# Patient Record
Sex: MALE | Race: WHITE | HISPANIC OR LATINO | Employment: FULL TIME | URBAN - METROPOLITAN AREA
[De-identification: names, ages, dates, MRNs, and addresses within clinical notes are randomized per-mention and may not be internally consistent; named-entity substitution may affect disease eponyms.]

---

## 2024-06-07 ENCOUNTER — APPOINTMENT (OUTPATIENT)
Dept: GENERAL RADIOLOGY | Facility: CLINIC | Age: 19
End: 2024-06-07
Attending: EMERGENCY MEDICINE

## 2024-06-07 ENCOUNTER — HOSPITAL ENCOUNTER (EMERGENCY)
Facility: CLINIC | Age: 19
Discharge: HOME | End: 2024-06-08
Attending: EMERGENCY MEDICINE | Admitting: EMERGENCY MEDICINE

## 2024-06-07 DIAGNOSIS — S81.811A LACERATION OF RIGHT LOWER EXTREMITY, INITIAL ENCOUNTER: ICD-10-CM

## 2024-06-07 DIAGNOSIS — S80.812A ABRASION OF LEFT LOWER EXTREMITY, INITIAL ENCOUNTER: ICD-10-CM

## 2024-06-07 PROCEDURE — 73590 X-RAY EXAM OF LOWER LEG: CPT | Mod: RT

## 2024-06-07 PROCEDURE — 90715 TDAP VACCINE 7 YRS/> IM: CPT | Mod: JZ | Performed by: EMERGENCY MEDICINE

## 2024-06-07 PROCEDURE — 12004 RPR S/N/AX/GEN/TRK7.6-12.5CM: CPT

## 2024-06-07 PROCEDURE — 250N000011 HC RX IP 250 OP 636: Mod: JZ | Performed by: EMERGENCY MEDICINE

## 2024-06-07 PROCEDURE — 99283 EMERGENCY DEPT VISIT LOW MDM: CPT | Mod: 25

## 2024-06-07 PROCEDURE — 90471 IMMUNIZATION ADMIN: CPT | Performed by: EMERGENCY MEDICINE

## 2024-06-07 RX ADMIN — CLOSTRIDIUM TETANI TOXOID ANTIGEN (FORMALDEHYDE INACTIVATED), CORYNEBACTERIUM DIPHTHERIAE TOXOID ANTIGEN (FORMALDEHYDE INACTIVATED), BORDETELLA PERTUSSIS TOXOID ANTIGEN (GLUTARALDEHYDE INACTIVATED), BORDETELLA PERTUSSIS FILAMENTOUS HEMAGGLUTININ ANTIGEN (FORMALDEHYDE INACTIVATED), BORDETELLA PERTUSSIS PERTACTIN ANTIGEN, AND BORDETELLA PERTUSSIS FIMBRIAE 2/3 ANTIGEN 0.5 ML: 5; 2; 2.5; 5; 3; 5 INJECTION, SUSPENSION INTRAMUSCULAR at 23:55

## 2024-06-07 ASSESSMENT — COLUMBIA-SUICIDE SEVERITY RATING SCALE - C-SSRS
6. HAVE YOU EVER DONE ANYTHING, STARTED TO DO ANYTHING, OR PREPARED TO DO ANYTHING TO END YOUR LIFE?: NO
6. HAVE YOU EVER DONE ANYTHING, STARTED TO DO ANYTHING, OR PREPARED TO DO ANYTHING TO END YOUR LIFE?: NO
2. HAVE YOU ACTUALLY HAD ANY THOUGHTS OF KILLING YOURSELF IN THE PAST MONTH?: NO
1. IN THE PAST MONTH, HAVE YOU WISHED YOU WERE DEAD OR WISHED YOU COULD GO TO SLEEP AND NOT WAKE UP?: NO
2. HAVE YOU ACTUALLY HAD ANY THOUGHTS OF KILLING YOURSELF IN THE PAST MONTH?: NO
1. IN THE PAST MONTH, HAVE YOU WISHED YOU WERE DEAD OR WISHED YOU COULD GO TO SLEEP AND NOT WAKE UP?: NO

## 2024-06-08 VITALS
SYSTOLIC BLOOD PRESSURE: 116 MMHG | RESPIRATION RATE: 18 BRPM | DIASTOLIC BLOOD PRESSURE: 75 MMHG | HEART RATE: 97 BPM | TEMPERATURE: 98.7 F | OXYGEN SATURATION: 99 %

## 2024-06-08 PROCEDURE — 250N000009 HC RX 250: Performed by: EMERGENCY MEDICINE

## 2024-06-08 RX ORDER — LIDOCAINE HYDROCHLORIDE AND EPINEPHRINE 10; 10 MG/ML; UG/ML
INJECTION, SOLUTION INFILTRATION; PERINEURAL
Status: DISCONTINUED
Start: 2024-06-08 | End: 2024-06-08 | Stop reason: HOSPADM

## 2024-06-08 RX ORDER — LIDOCAINE HYDROCHLORIDE AND EPINEPHRINE 10; 10 MG/ML; UG/ML
INJECTION, SOLUTION INFILTRATION; PERINEURAL
Status: COMPLETED
Start: 2024-06-08 | End: 2024-06-08

## 2024-06-08 RX ORDER — CEPHALEXIN 500 MG/1
500 CAPSULE ORAL 4 TIMES DAILY
Qty: 28 CAPSULE | Refills: 0 | Status: SHIPPED | OUTPATIENT
Start: 2024-06-08 | End: 2024-06-15

## 2024-06-08 RX ADMIN — LIDOCAINE HYDROCHLORIDE,EPINEPHRINE BITARTRATE: 10; .01 INJECTION, SOLUTION INFILTRATION; PERINEURAL at 01:22

## 2024-06-08 ASSESSMENT — ACTIVITIES OF DAILY LIVING (ADL)
ADLS_ACUITY_SCORE: 35
ADLS_ACUITY_SCORE: 35

## 2024-06-08 NOTE — ED PROVIDER NOTES
History     Chief Complaint:  Laceration    HPI   Rex Jensen is a 18 year old male who presents from work.  Sounds like he accidentally tripped over what is described as a transfer with a sharp cutting edge.  He sustained a large laceration to his right anterior lower leg.  Prior to EMS arrival, first responders applied a tourniquet at 2310 due to the bleeding.  Wound has been hemostatic since that time.  Patient does not know when his last tetanus was.  He has declined pain medicine to this time.  No other injuries.    Independent Historian:    Patient provides history above in addition to EMS personnel giving bedside report    Review of External Notes:  None    Medications:    No current outpatient medications on file.    Past Medical History:    No past medical history on file.    Past Surgical History:    No past surgical history on file.       Physical Exam   Patient Vitals for the past 24 hrs:   BP Temp Temp src Pulse SpO2   06/07/24 2343 -- -- -- -- 100 %   06/07/24 2342 -- -- -- -- 99 %   06/07/24 2341 -- -- -- -- 100 %   06/07/24 2340 -- -- -- -- 100 %   06/07/24 2339 -- -- -- -- 100 %   06/07/24 2338 136/89 98.7  F (37.1  C) Oral 97 100 %      Physical Exam  Nursing note and vitals reviewed.  Constitutional: Cooperative.  Sitting up comfortably  Cardiovascular: Normal rate, regular rhythm.  After removal of the tourniquet on the right lower extremity he has 2+ dorsalis pedis and posterior tibialis pulses  Pulmonary/Chest: Effort normal .   Musculoskeletal: Normal range of motion of bilateral lower extremities without deformity.   Neurological: Alert.  Oriented x 3.  Strength and sensation in right distal lower extremities normal  Skin: Skin is warm and dry.  There is a large gaping laceration that is U-shaped to the anterior right lower leg.  Tourniquet was removed at 2337 on arrival to the ED with no active bleeding noted.  Normal perfusion after removal of the tourniquet after patient  initially presented with discoloration of the right lower extremity distal to the tourniquet.  Abrasion to left anterior lower leg.  Psychiatric: Normal mood and affect.       Emergency Department Course     Imaging:  XR Tibia and Fibula Right 2 Views    (Results Pending)     Procedures   Laceration repair  Location: Right lower leg  Description: U-shaped deep laceration measuring 9.4 cm.  No foreign body.    No debridement of tissue needed.  Preparation: Wound was anesthetized using 15 cc of 1% lidocaine with epinephrine infiltrated locally.  It was then thoroughly irrigated with a large amount of normal saline and scrubbed with Shneto-Clens.  Technique: The wound was repaired in 2 layers.  The subcutaneous tissue was reapproximated using six 4-0 Vicryl simple interrupted sutures.  The skin was closed using 17 simple interrupted nylon 4-0 sutures.  Complications: None    Interventions:  Medications   Tdap (tetanus-diphtheria-acell pertussis) (ADACEL) injection 0.5 mL (has no administration in time range)      Assessments:  2337 patient evaluated at the bedside upon EMS arrival.  Tourniquet removed as noted in the physical exam.  No active bleeding.  Exam performed.  Patient declined pain medicine at this time.  No indication for trauma activation given no active hemorrhage or indication that there is a vascular injury    Independent Interpretation (X-rays, CTs, rhythm strip):  I independently reviewed the right lower extremity x-ray.  No fracture identified or foreign body near the site of the laceration    Consultations/Discussion of Management or Tests:  None     Social Determinants of Health affecting care:  None     Disposition:  The patient was discharged.    Impression & Plan       Medical Decision Makin-year-old male with a very deep laceration to his right anterior lower leg as described in the physical exam.  Fortunately there was no penetration of the lower extremity fascia or bone involvement.  No  foreign bodies.  Tetanus was updated.  Wound was repaired in the primary fashion as per the procedure note after thorough irrigation and scrubbing.  Follow-up with primary care for suture removal in 1 week.  Wound care instructions provided.  Given the depth of the wound, antibiotics prescribed    Diagnosis:    ICD-10-CM    1. Laceration of right lower extremity, initial encounter  S81.811A       2. Abrasion of left lower extremity, initial encounter  S80.812A            Discharge Medications:  New Prescriptions    CEPHALEXIN (KEFLEX) 500 MG CAPSULE    Take 1 capsule (500 mg) by mouth 4 times daily for 7 days             Jong Hess MD  06/08/24 0116

## 2024-06-08 NOTE — ED TRIAGE NOTES
Patient arrives via EMS with concerns for a 4-5in laceration to his right lower leg that he sustained when tripping over a trenching machine. Tourniquet was applied at 2310 PTA. MD removed tourniquet at 2337 and no bleeding was observed. Thus a full TTA was noted called. Unknown tentus status.  Patient declined pain medication and IV for EMS.      Triage Assessment (Adult)       Row Name 06/07/24 0586          Triage Assessment    Airway WDL WDL        Respiratory WDL    Respiratory WDL WDL        Skin Circulation/Temperature WDL    Skin Circulation/Temperature WDL X        Cardiac WDL    Cardiac WDL WDL        Peripheral/Neurovascular WDL    Peripheral Neurovascular WDL X        Cognitive/Neuro/Behavioral WDL    Cognitive/Neuro/Behavioral WDL WDL